# Patient Record
Sex: FEMALE | Race: WHITE | NOT HISPANIC OR LATINO | Employment: UNEMPLOYED | URBAN - METROPOLITAN AREA
[De-identification: names, ages, dates, MRNs, and addresses within clinical notes are randomized per-mention and may not be internally consistent; named-entity substitution may affect disease eponyms.]

---

## 2023-03-01 LAB
ALANINE AMINOTRANSFERASE (SGPT) (U/L) IN SER/PLAS: 12 U/L (ref 7–45)
ALBUMIN (G/DL) IN SER/PLAS: 4.1 G/DL (ref 3.4–5)
ALKALINE PHOSPHATASE (U/L) IN SER/PLAS: 115 U/L (ref 33–136)
ANION GAP IN SER/PLAS: 12 MMOL/L (ref 10–20)
ASPARTATE AMINOTRANSFERASE (SGOT) (U/L) IN SER/PLAS: 15 U/L (ref 9–39)
BILIRUBIN TOTAL (MG/DL) IN SER/PLAS: 0.7 MG/DL (ref 0–1.2)
CALCIUM (MG/DL) IN SER/PLAS: 9.6 MG/DL (ref 8.6–10.6)
CARBON DIOXIDE, TOTAL (MMOL/L) IN SER/PLAS: 29 MMOL/L (ref 21–32)
CHLORIDE (MMOL/L) IN SER/PLAS: 101 MMOL/L (ref 98–107)
CHOLESTEROL (MG/DL) IN SER/PLAS: 153 MG/DL (ref 0–199)
CHOLESTEROL IN HDL (MG/DL) IN SER/PLAS: 51.3 MG/DL
CHOLESTEROL/HDL RATIO: 3
CREATININE (MG/DL) IN SER/PLAS: 1.37 MG/DL (ref 0.5–1.05)
ERYTHROCYTE DISTRIBUTION WIDTH (RATIO) BY AUTOMATED COUNT: 11.9 % (ref 11.5–14.5)
ERYTHROCYTE MEAN CORPUSCULAR HEMOGLOBIN CONCENTRATION (G/DL) BY AUTOMATED: 33.1 G/DL (ref 32–36)
ERYTHROCYTE MEAN CORPUSCULAR VOLUME (FL) BY AUTOMATED COUNT: 95 FL (ref 80–100)
ERYTHROCYTES (10*6/UL) IN BLOOD BY AUTOMATED COUNT: 4 X10E12/L (ref 4–5.2)
GFR FEMALE: 40 ML/MIN/1.73M2
GLUCOSE (MG/DL) IN SER/PLAS: 107 MG/DL (ref 74–99)
HEMATOCRIT (%) IN BLOOD BY AUTOMATED COUNT: 38.1 % (ref 36–46)
HEMOGLOBIN (G/DL) IN BLOOD: 12.6 G/DL (ref 12–16)
LDL: 80 MG/DL (ref 0–99)
LEUKOCYTES (10*3/UL) IN BLOOD BY AUTOMATED COUNT: 6.8 X10E9/L (ref 4.4–11.3)
MAGNESIUM (MG/DL) IN SER/PLAS: 1.85 MG/DL (ref 1.6–2.4)
NRBC (PER 100 WBCS) BY AUTOMATED COUNT: 0 /100 WBC (ref 0–0)
PLATELETS (10*3/UL) IN BLOOD AUTOMATED COUNT: 276 X10E9/L (ref 150–450)
POTASSIUM (MMOL/L) IN SER/PLAS: 4.3 MMOL/L (ref 3.5–5.3)
PROTEIN TOTAL: 6.8 G/DL (ref 6.4–8.2)
SODIUM (MMOL/L) IN SER/PLAS: 138 MMOL/L (ref 136–145)
TRIGLYCERIDE (MG/DL) IN SER/PLAS: 109 MG/DL (ref 0–149)
UREA NITROGEN (MG/DL) IN SER/PLAS: 27 MG/DL (ref 6–23)
VLDL: 22 MG/DL (ref 0–40)

## 2024-02-06 ENCOUNTER — OFFICE VISIT (OUTPATIENT)
Dept: OPHTHALMOLOGY | Facility: CLINIC | Age: 79
End: 2024-02-06
Payer: MEDICARE

## 2024-02-06 DIAGNOSIS — H52.223 MYOPIA OF BOTH EYES WITH REGULAR ASTIGMATISM AND PRESBYOPIA: ICD-10-CM

## 2024-02-06 DIAGNOSIS — H52.4 MYOPIA OF BOTH EYES WITH REGULAR ASTIGMATISM AND PRESBYOPIA: ICD-10-CM

## 2024-02-06 DIAGNOSIS — H04.123 DRY EYE SYNDROME OF BOTH EYES: ICD-10-CM

## 2024-02-06 DIAGNOSIS — H02.889 MEIBOMIAN GLAND DYSFUNCTION: ICD-10-CM

## 2024-02-06 DIAGNOSIS — H25.013 CORTICAL AGE-RELATED CATARACT OF BOTH EYES: Primary | ICD-10-CM

## 2024-02-06 DIAGNOSIS — H52.13 MYOPIA OF BOTH EYES WITH REGULAR ASTIGMATISM AND PRESBYOPIA: ICD-10-CM

## 2024-02-06 PROCEDURE — 92015 DETERMINE REFRACTIVE STATE: CPT | Performed by: STUDENT IN AN ORGANIZED HEALTH CARE EDUCATION/TRAINING PROGRAM

## 2024-02-06 PROCEDURE — 92014 COMPRE OPH EXAM EST PT 1/>: CPT | Performed by: STUDENT IN AN ORGANIZED HEALTH CARE EDUCATION/TRAINING PROGRAM

## 2024-02-06 RX ORDER — HYDROGEN PEROXIDE 3 %
20 SOLUTION, NON-ORAL MISCELLANEOUS
COMMUNITY
Start: 2016-09-13

## 2024-02-06 RX ORDER — ACETAMINOPHEN 500 MG
TABLET ORAL
COMMUNITY
Start: 2019-06-18

## 2024-02-06 RX ORDER — HYDROCHLOROTHIAZIDE 25 MG/1
TABLET ORAL
COMMUNITY
Start: 2023-02-17

## 2024-02-06 ASSESSMENT — REFRACTION_WEARINGRX
OD_AXIS: 015
OS_ADD: +2.50
OS_AXIS: 165
OD_SPHERE: -2.50
OD_ADD: +2.50
OD_CYLINDER: +0.75
OS_CYLINDER: +1.25
OS_SPHERE: -2.25

## 2024-02-06 ASSESSMENT — CUP TO DISC RATIO
OS_RATIO: .15
OD_RATIO: .15

## 2024-02-06 ASSESSMENT — CONF VISUAL FIELD
OS_INFERIOR_TEMPORAL_RESTRICTION: 0
OD_SUPERIOR_NASAL_RESTRICTION: 0
OS_SUPERIOR_NASAL_RESTRICTION: 0
OS_INFERIOR_NASAL_RESTRICTION: 0
OD_INFERIOR_NASAL_RESTRICTION: 0
METHOD: COUNTING FINGERS
OD_INFERIOR_TEMPORAL_RESTRICTION: 0
OS_NORMAL: 1
OD_NORMAL: 1
OS_SUPERIOR_TEMPORAL_RESTRICTION: 0
OD_SUPERIOR_TEMPORAL_RESTRICTION: 0

## 2024-02-06 ASSESSMENT — ENCOUNTER SYMPTOMS
CONSTITUTIONAL NEGATIVE: 0
CARDIOVASCULAR NEGATIVE: 0
ALLERGIC/IMMUNOLOGIC NEGATIVE: 0
HEMATOLOGIC/LYMPHATIC NEGATIVE: 0
NEUROLOGICAL NEGATIVE: 0
PSYCHIATRIC NEGATIVE: 0
MUSCULOSKELETAL NEGATIVE: 0
ENDOCRINE NEGATIVE: 0
EYES NEGATIVE: 0
RESPIRATORY NEGATIVE: 0
GASTROINTESTINAL NEGATIVE: 0

## 2024-02-06 ASSESSMENT — VISUAL ACUITY
OS_CC: 20/30
METHOD: SNELLEN - LINEAR
OD_CC: 20/30
CORRECTION_TYPE: GLASSES

## 2024-02-06 ASSESSMENT — REFRACTION_MANIFEST
OD_SPHERE: -2.25
OS_SPHERE: -2.25
OS_CYLINDER: +1.25
OD_ADD: +2.50
OS_AXIS: 172
OD_AXIS: 012
OS_ADD: +2.50
OD_CYLINDER: +0.50

## 2024-02-06 ASSESSMENT — EXTERNAL EXAM - RIGHT EYE: OD_EXAM: NORMAL

## 2024-02-06 ASSESSMENT — SLIT LAMP EXAM - LIDS
COMMENTS: GOOD POSITION, MGD UL/LL C CAPPED GLANDS
COMMENTS: GOOD POSITION, MGD UL/LL C CAPPED GLANDS

## 2024-02-06 ASSESSMENT — TONOMETRY
IOP_METHOD: GOLDMANN APPLANATION
OS_IOP_MMHG: 12
OD_IOP_MMHG: 11

## 2024-02-06 ASSESSMENT — EXTERNAL EXAM - LEFT EYE: OS_EXAM: NORMAL

## 2024-02-06 NOTE — PROGRESS NOTES
Assessment/Plan   Diagnoses and all orders for this visit:  Cortical age-related cataract of both eyes  Myopia of both eyes with regular astigmatism and presbyopia  Dry eye syndrome of both eyes  Meibomian gland dysfunction  -New spec rx released today per patient request. Ocular health wnl for age OU. Monitor 1 year or sooner prn. Refraction billed today.  -Mild reduction in BCVA 20/25 OD+OS due to cataracts and mild ocular surface disease. Pt ed on findings. Monitor cataracts at this time. Advised using OTC AT's daily and adding warm compresses    RTC 1 year for annual with DFE and MRX

## 2024-02-15 ENCOUNTER — OFFICE VISIT (OUTPATIENT)
Dept: CARDIOLOGY | Facility: CLINIC | Age: 79
End: 2024-02-15
Payer: MEDICARE

## 2024-02-15 VITALS
DIASTOLIC BLOOD PRESSURE: 77 MMHG | BODY MASS INDEX: 26.54 KG/M2 | SYSTOLIC BLOOD PRESSURE: 143 MMHG | WEIGHT: 159.3 LBS | HEART RATE: 73 BPM | OXYGEN SATURATION: 98 % | RESPIRATION RATE: 16 BRPM | HEIGHT: 65 IN

## 2024-02-15 DIAGNOSIS — E78.5 HYPERLIPIDEMIA, UNSPECIFIED HYPERLIPIDEMIA TYPE: ICD-10-CM

## 2024-02-15 DIAGNOSIS — I10 HYPERTENSION, UNSPECIFIED TYPE: Primary | ICD-10-CM

## 2024-02-15 DIAGNOSIS — I25.10 CORONARY ARTERY DISEASE INVOLVING NATIVE CORONARY ARTERY OF NATIVE HEART WITHOUT ANGINA PECTORIS: ICD-10-CM

## 2024-02-15 PROCEDURE — 3078F DIAST BP <80 MM HG: CPT | Performed by: INTERNAL MEDICINE

## 2024-02-15 PROCEDURE — 1036F TOBACCO NON-USER: CPT | Performed by: INTERNAL MEDICINE

## 2024-02-15 PROCEDURE — 1159F MED LIST DOCD IN RCRD: CPT | Performed by: INTERNAL MEDICINE

## 2024-02-15 PROCEDURE — 99213 OFFICE O/P EST LOW 20 MIN: CPT | Performed by: INTERNAL MEDICINE

## 2024-02-15 PROCEDURE — 1160F RVW MEDS BY RX/DR IN RCRD: CPT | Performed by: INTERNAL MEDICINE

## 2024-02-15 PROCEDURE — 1126F AMNT PAIN NOTED NONE PRSNT: CPT | Performed by: INTERNAL MEDICINE

## 2024-02-15 PROCEDURE — 3077F SYST BP >= 140 MM HG: CPT | Performed by: INTERNAL MEDICINE

## 2024-02-15 RX ORDER — ASPIRIN 81 MG/1
1 TABLET ORAL DAILY
COMMUNITY
Start: 2020-08-14

## 2024-02-15 ASSESSMENT — ENCOUNTER SYMPTOMS
COUGH: 0
HEMOPTYSIS: 0
DIARRHEA: 0
HEMATURIA: 0
WHEEZING: 0
DYSURIA: 0
MEMORY LOSS: 0
CONSTIPATION: 0
VOMITING: 0
ABDOMINAL PAIN: 0
NAUSEA: 0
FEVER: 0
MYALGIAS: 0
ALTERED MENTAL STATUS: 0
FALLS: 0
HEADACHES: 0
DEPRESSION: 0
BLOATING: 0
CHILLS: 0

## 2024-02-15 ASSESSMENT — PATIENT HEALTH QUESTIONNAIRE - PHQ9
1. LITTLE INTEREST OR PLEASURE IN DOING THINGS: NOT AT ALL
2. FEELING DOWN, DEPRESSED OR HOPELESS: NOT AT ALL
SUM OF ALL RESPONSES TO PHQ9 QUESTIONS 1 AND 2: 0

## 2024-02-15 NOTE — PROGRESS NOTES
Chief complaint:  Follow-up (Annual FUV)     HPI  79 yo WF w/ h/o CAD, HTN, HPL, VV now here for cardiology f/u. No chest pain. No dyspnea. No palps. No LH/dizziness/syncope. No edema. No claudication. No cough. +occ snoring, sig less after sinus surgery. No apnea. +occ back pain.  Pre back/neck issues, she was exercising with  3-4d/wk with no symptoms. Now just some walking. She plans to get back with  soon since back/neck better.  BP at home: ~120/80  ECG 7/18: SR (64)  ECG 6/19: SR (76)  MARIO 1/14: 2mm ST dep, EF 70% -> 80%, SVT x 40s  Cath 1/14: LM nl, LAD 60%, LCx nl, RCA nl, LVEDP 10  LE US 8/18: no DVT  RLE US 6/20: no DVT     Review of Systems   Constitutional: Negative for chills, fever and malaise/fatigue.   HENT:  Negative for hearing loss.    Eyes:  Negative for visual disturbance.   Respiratory:  Negative for cough, hemoptysis and wheezing.    Skin:  Negative for rash.   Musculoskeletal:  Negative for falls and myalgias.   Gastrointestinal:  Negative for bloating, abdominal pain, constipation, diarrhea, dysphagia, nausea and vomiting.   Genitourinary:  Negative for dysuria and hematuria.   Neurological:  Negative for headaches.   Psychiatric/Behavioral:  Negative for altered mental status, depression and memory loss.       Social History     Tobacco Use    Smoking status: Never    Smokeless tobacco: Never   Substance Use Topics    Alcohol use: Not Currently      No pertinent FHx    Allergies   Allergen Reactions    Quinalan Anaphylaxis    Bee Pollen Unknown    House Dust Unknown      Current Outpatient Medications   Medication Instructions    aspirin 81 mg EC tablet 1 tablet, oral, Daily    atorvastatin (LIPITOR) 40 mg, oral, Daily    carvedilol (COREG) 25 mg, oral, 2 times daily with meals    cholecalciferol (Vitamin D-3) 5,000 Units tablet oral    esomeprazole (NEXIUM) 20 mg, oral, Daily RT    hydroCHLOROthiazide (HYDRODiuril) 25 mg tablet     losartan (COZAAR) 100 mg, oral, Daily       Vitals:    02/15/24 1013   BP: 143/77   Pulse: 73   Resp: 16   SpO2: 98%      Physical Exam  Constitutional:       Appearance: Normal appearance.   HENT:      Head: Normocephalic and atraumatic.      Nose: Nose normal.   Neck:      Vascular: No carotid bruit.   Cardiovascular:      Rate and Rhythm: Normal rate and regular rhythm.      Heart sounds: No murmur heard.  Pulmonary:      Effort: Pulmonary effort is normal.      Breath sounds: Normal breath sounds.   Abdominal:      Palpations: Abdomen is soft.      Tenderness: There is no abdominal tenderness.   Musculoskeletal:      Right lower leg: No edema.      Left lower leg: No edema.   Skin:     General: Skin is warm and dry.   Neurological:      General: No focal deficit present.      Mental Status: She is alert.   Psychiatric:         Mood and Affect: Mood normal.         Judgment: Judgment normal.        Results/Data  2/23 Cr 1.37, K 4.3, Mg 1.85, LFT nl, LDL 80, HDL 51, , Chol 153, HGB 12.6,   6/21 LDL 59, HDL 57, , Chol 154  5/20 Cr 0.85, K 4.2, HGB 13,   5/18 Cr 0.87, K 3.8, HGB 13.9, , TSH 1.97, TPN neg  1/18 Cr 0.77, K 4.6, LFT nl, LDL 72, HDL 63, TG 97, Chol 155     Assessment/Plan   79 yo WF w/ h/o CAD, HTN, HPL, VV. Doing fair. No cardiac symptoms. BP slightly high today (better than prior clinic appts) but good at home (c/w [facility]).  -continue ASA 81 qd  -continue Carvedilol 25 bid  -continue Losartan 100 qd   -continue HCTZ 25 qd (can later combine with Losartan); she had edema on Amlodipine -> recheck Cr now  -continue Atorva 40 qhs -> goal LDL <70  -consider sleep study, [place] if any witnessed apnea (and/or BP cannot be controlled)  -f/u 1 year (earlier if needed)     Fabio Gregg MD

## 2024-02-19 ENCOUNTER — TELEPHONE (OUTPATIENT)
Dept: CARDIOLOGY | Facility: CLINIC | Age: 79
End: 2024-02-19
Payer: MEDICARE

## 2024-02-19 RX ORDER — HYDRALAZINE HYDROCHLORIDE 10 MG/1
10 TABLET, FILM COATED ORAL 2 TIMES DAILY
COMMUNITY
End: 2024-04-11 | Stop reason: SDUPTHER

## 2024-02-22 ENCOUNTER — LAB (OUTPATIENT)
Dept: LAB | Facility: LAB | Age: 79
End: 2024-02-22
Payer: MEDICARE

## 2024-02-22 DIAGNOSIS — I25.10 CORONARY ARTERY DISEASE INVOLVING NATIVE CORONARY ARTERY OF NATIVE HEART WITHOUT ANGINA PECTORIS: ICD-10-CM

## 2024-02-22 LAB
ALBUMIN SERPL BCP-MCNC: 3.8 G/DL (ref 3.4–5)
ALP SERPL-CCNC: 97 U/L (ref 33–136)
ALT SERPL W P-5'-P-CCNC: 11 U/L (ref 7–45)
ANION GAP SERPL CALC-SCNC: 11 MMOL/L (ref 10–20)
AST SERPL W P-5'-P-CCNC: 12 U/L (ref 9–39)
BILIRUB SERPL-MCNC: 0.6 MG/DL (ref 0–1.2)
BUN SERPL-MCNC: 16 MG/DL (ref 6–23)
CALCIUM SERPL-MCNC: 9.4 MG/DL (ref 8.6–10.6)
CHLORIDE SERPL-SCNC: 107 MMOL/L (ref 98–107)
CHOLEST SERPL-MCNC: 148 MG/DL (ref 0–199)
CHOLESTEROL/HDL RATIO: 2.8
CO2 SERPL-SCNC: 25 MMOL/L (ref 21–32)
CREAT SERPL-MCNC: 1.23 MG/DL (ref 0.5–1.05)
EGFRCR SERPLBLD CKD-EPI 2021: 46 ML/MIN/1.73M*2
ERYTHROCYTE [DISTWIDTH] IN BLOOD BY AUTOMATED COUNT: 13.2 % (ref 11.5–14.5)
GLUCOSE SERPL-MCNC: 95 MG/DL (ref 74–99)
HCT VFR BLD AUTO: 34.7 % (ref 36–46)
HDLC SERPL-MCNC: 53.5 MG/DL
HGB BLD-MCNC: 11.4 G/DL (ref 12–16)
LDLC SERPL CALC-MCNC: 68 MG/DL
MCH RBC QN AUTO: 30.6 PG (ref 26–34)
MCHC RBC AUTO-ENTMCNC: 32.9 G/DL (ref 32–36)
MCV RBC AUTO: 93 FL (ref 80–100)
NON HDL CHOLESTEROL: 95 MG/DL (ref 0–149)
NRBC BLD-RTO: 0 /100 WBCS (ref 0–0)
PLATELET # BLD AUTO: 230 X10*3/UL (ref 150–450)
POTASSIUM SERPL-SCNC: 4.2 MMOL/L (ref 3.5–5.3)
PROT SERPL-MCNC: 6.6 G/DL (ref 6.4–8.2)
RBC # BLD AUTO: 3.73 X10*6/UL (ref 4–5.2)
SODIUM SERPL-SCNC: 139 MMOL/L (ref 136–145)
TRIGL SERPL-MCNC: 133 MG/DL (ref 0–149)
VLDL: 27 MG/DL (ref 0–40)
WBC # BLD AUTO: 5.4 X10*3/UL (ref 4.4–11.3)

## 2024-02-22 PROCEDURE — 80061 LIPID PANEL: CPT

## 2024-02-22 PROCEDURE — 36415 COLL VENOUS BLD VENIPUNCTURE: CPT

## 2024-02-22 PROCEDURE — 85027 COMPLETE CBC AUTOMATED: CPT

## 2024-02-22 PROCEDURE — 80053 COMPREHEN METABOLIC PANEL: CPT

## 2024-04-11 DIAGNOSIS — I10 HYPERTENSION, UNSPECIFIED TYPE: Primary | ICD-10-CM

## 2024-04-11 RX ORDER — HYDRALAZINE HYDROCHLORIDE 10 MG/1
10 TABLET, FILM COATED ORAL 2 TIMES DAILY
Qty: 180 TABLET | Refills: 3 | Status: SHIPPED | OUTPATIENT
Start: 2024-04-11

## 2025-02-11 ENCOUNTER — APPOINTMENT (OUTPATIENT)
Dept: OPHTHALMOLOGY | Facility: CLINIC | Age: 80
End: 2025-02-11
Payer: MEDICARE

## 2025-02-11 DIAGNOSIS — H25.013 CORTICAL AGE-RELATED CATARACT OF BOTH EYES: Primary | ICD-10-CM

## 2025-02-11 DIAGNOSIS — H52.223 MYOPIA OF BOTH EYES WITH REGULAR ASTIGMATISM AND PRESBYOPIA: ICD-10-CM

## 2025-02-11 DIAGNOSIS — D31.32 CHOROIDAL NEVUS OF LEFT EYE: ICD-10-CM

## 2025-02-11 DIAGNOSIS — H04.123 DRY EYE SYNDROME OF BOTH EYES: ICD-10-CM

## 2025-02-11 DIAGNOSIS — H52.13 MYOPIA OF BOTH EYES WITH REGULAR ASTIGMATISM AND PRESBYOPIA: ICD-10-CM

## 2025-02-11 DIAGNOSIS — H52.4 MYOPIA OF BOTH EYES WITH REGULAR ASTIGMATISM AND PRESBYOPIA: ICD-10-CM

## 2025-02-11 DIAGNOSIS — Z83.518 FAMILY HISTORY OF MACULAR DEGENERATION: ICD-10-CM

## 2025-02-11 PROCEDURE — 92014 COMPRE OPH EXAM EST PT 1/>: CPT | Performed by: STUDENT IN AN ORGANIZED HEALTH CARE EDUCATION/TRAINING PROGRAM

## 2025-02-11 PROCEDURE — 92015 DETERMINE REFRACTIVE STATE: CPT | Performed by: STUDENT IN AN ORGANIZED HEALTH CARE EDUCATION/TRAINING PROGRAM

## 2025-02-11 ASSESSMENT — ENCOUNTER SYMPTOMS
ALLERGIC/IMMUNOLOGIC NEGATIVE: 0
GASTROINTESTINAL NEGATIVE: 0
CONSTITUTIONAL NEGATIVE: 0
PSYCHIATRIC NEGATIVE: 0
RESPIRATORY NEGATIVE: 0
HEMATOLOGIC/LYMPHATIC NEGATIVE: 0
MUSCULOSKELETAL NEGATIVE: 0
ENDOCRINE NEGATIVE: 0
NEUROLOGICAL NEGATIVE: 0
EYES NEGATIVE: 0
CARDIOVASCULAR NEGATIVE: 0

## 2025-02-11 ASSESSMENT — CONF VISUAL FIELD
OS_SUPERIOR_NASAL_RESTRICTION: 0
OS_INFERIOR_NASAL_RESTRICTION: 0
OD_SUPERIOR_TEMPORAL_RESTRICTION: 0
METHOD: COUNTING FINGERS
OD_INFERIOR_TEMPORAL_RESTRICTION: 0
OD_SUPERIOR_NASAL_RESTRICTION: 0
OD_INFERIOR_NASAL_RESTRICTION: 0
OS_SUPERIOR_TEMPORAL_RESTRICTION: 0
OD_NORMAL: 1
OS_INFERIOR_TEMPORAL_RESTRICTION: 0
OS_NORMAL: 1

## 2025-02-11 ASSESSMENT — VISUAL ACUITY
OD_CC: 20/20-1
METHOD: SNELLEN - LINEAR
CORRECTION_TYPE: GLASSES
OS_CC: 20/20

## 2025-02-11 ASSESSMENT — TONOMETRY
OD_IOP_MMHG: 12
OS_IOP_MMHG: 11
IOP_METHOD: GOLDMANN APPLANATION

## 2025-02-11 ASSESSMENT — CUP TO DISC RATIO
OS_RATIO: .15
OD_RATIO: .15

## 2025-02-11 ASSESSMENT — REFRACTION_MANIFEST
OS_AXIS: 172
OS_ADD: +2.50
OD_ADD: +2.50
OD_AXIS: 012
OS_CYLINDER: +1.25
OS_SPHERE: -2.50
OD_CYLINDER: +0.50
OD_SPHERE: -2.25

## 2025-02-11 ASSESSMENT — REFRACTION_WEARINGRX
OD_SPHERE: -2.25
OS_CYLINDER: +1.25
OS_SPHERE: -2.25
OD_AXIS: 012
OS_AXIS: 172
OD_CYLINDER: +0.50
OD_ADD: +2.50
OS_ADD: +2.50

## 2025-02-11 ASSESSMENT — EXTERNAL EXAM - RIGHT EYE: OD_EXAM: NORMAL

## 2025-02-11 ASSESSMENT — EXTERNAL EXAM - LEFT EYE: OS_EXAM: NORMAL

## 2025-02-11 NOTE — PROGRESS NOTES
Assessment/Plan   Diagnoses and all orders for this visit:  Cortical age-related cataract of both eyes  Myopia of both eyes with regular astigmatism and presbyopia  Dry eye syndrome of both eyes  Meibomian gland dysfunction  -New spec rx released today per patient request. Ocular health wnl for age OU. Monitor 1 year or sooner prn. Refraction billed today.  -Mild reduction in BCVA ~20/25 OD+OS due to cataracts and mild ocular surface disease. Pt ed on findings. Monitor cataracts at this time. Advised using OTC AT's daily and adding warm compresses.  Family History of macular degeneration-mother  -mild mottling on exam but otherwise within normal limits  -patient reports healthy diet  -Discussed imporantance of sun protection outdoors, not smoking, healthy diet and exercise.   -obtain MAC OCT at next exam to monitor  Choroidal nevus left eye  -benign features-monitor-low risk    RTC 1 year for annual with DFE, MRX, OCT MAC

## 2025-02-27 ENCOUNTER — OFFICE VISIT (OUTPATIENT)
Dept: CARDIOLOGY | Facility: CLINIC | Age: 80
End: 2025-02-27
Payer: MEDICARE

## 2025-02-27 VITALS
WEIGHT: 154 LBS | HEIGHT: 66 IN | OXYGEN SATURATION: 95 % | SYSTOLIC BLOOD PRESSURE: 122 MMHG | HEART RATE: 78 BPM | BODY MASS INDEX: 24.75 KG/M2 | DIASTOLIC BLOOD PRESSURE: 74 MMHG

## 2025-02-27 DIAGNOSIS — E78.5 HYPERLIPIDEMIA, UNSPECIFIED HYPERLIPIDEMIA TYPE: ICD-10-CM

## 2025-02-27 DIAGNOSIS — I25.10 CORONARY ARTERY DISEASE INVOLVING NATIVE CORONARY ARTERY OF NATIVE HEART WITHOUT ANGINA PECTORIS: ICD-10-CM

## 2025-02-27 DIAGNOSIS — I10 HYPERTENSION, UNSPECIFIED TYPE: Primary | ICD-10-CM

## 2025-02-27 LAB
ATRIAL RATE: 67 BPM
P AXIS: 69 DEGREES
P OFFSET: 196 MS
P ONSET: 135 MS
PR INTERVAL: 176 MS
Q ONSET: 223 MS
QRS COUNT: 11 BEATS
QRS DURATION: 88 MS
QT INTERVAL: 406 MS
QTC CALCULATION(BAZETT): 429 MS
QTC FREDERICIA: 421 MS
R AXIS: 50 DEGREES
T AXIS: 84 DEGREES
T OFFSET: 426 MS
VENTRICULAR RATE: 67 BPM

## 2025-02-27 PROCEDURE — 1036F TOBACCO NON-USER: CPT | Performed by: INTERNAL MEDICINE

## 2025-02-27 PROCEDURE — 93005 ELECTROCARDIOGRAM TRACING: CPT | Performed by: INTERNAL MEDICINE

## 2025-02-27 PROCEDURE — 99214 OFFICE O/P EST MOD 30 MIN: CPT | Performed by: INTERNAL MEDICINE

## 2025-02-27 PROCEDURE — 93010 ELECTROCARDIOGRAM REPORT: CPT | Performed by: INTERNAL MEDICINE

## 2025-02-27 PROCEDURE — 3074F SYST BP LT 130 MM HG: CPT | Performed by: INTERNAL MEDICINE

## 2025-02-27 PROCEDURE — 1159F MED LIST DOCD IN RCRD: CPT | Performed by: INTERNAL MEDICINE

## 2025-02-27 PROCEDURE — 3078F DIAST BP <80 MM HG: CPT | Performed by: INTERNAL MEDICINE

## 2025-02-27 PROCEDURE — 99214 OFFICE O/P EST MOD 30 MIN: CPT | Mod: 25 | Performed by: INTERNAL MEDICINE

## 2025-02-27 ASSESSMENT — PATIENT HEALTH QUESTIONNAIRE - PHQ9
1. LITTLE INTEREST OR PLEASURE IN DOING THINGS: NOT AT ALL
SUM OF ALL RESPONSES TO PHQ9 QUESTIONS 1 AND 2: 0
2. FEELING DOWN, DEPRESSED OR HOPELESS: NOT AT ALL

## 2025-02-27 ASSESSMENT — ENCOUNTER SYMPTOMS
DEPRESSION: 0
HEADACHES: 0
HEMATURIA: 0
MYALGIAS: 0
ALTERED MENTAL STATUS: 0
DIARRHEA: 0
ABDOMINAL PAIN: 0
DYSURIA: 0
CHILLS: 0
COUGH: 0
CONSTIPATION: 0
HEMOPTYSIS: 0
MEMORY LOSS: 0
FALLS: 0
BLOATING: 0
NAUSEA: 0
WHEEZING: 0
FEVER: 0
VOMITING: 0

## 2025-02-27 NOTE — PROGRESS NOTES
Chief Complaint   Patient presents with    Coronary Artery Disease    Hypertension       HPI  80 yo WF w/ h/o CAD, HTN, HPL, VV now here for cardiology f/u. No chest pain. No dyspnea. No palps. +occ LH when BP low. No syncope. No edema. No claudication. No cough. +occ snoring, sig less after sinus surgery. No apnea. +occ back pain. Activity limited by back/neck pain.  BP at home: ~110/70 (occ  with LH)  ECG 7/18: SR (64)  ECG 6/19: SR (76)  ECG 2/25: SR (67), normal  MARIO 1/14: 2mm ST dep, EF 70% -> 80%, SVT x 40s  Cath 1/14: LM nl, LAD 60%, LCx nl, RCA nl, LVEDP 10  LE US 8/18: no DVT  RLE US 6/20: no DVT     Review of Systems   Constitutional: Negative for chills, fever and malaise/fatigue.   HENT:  Negative for hearing loss.    Eyes:  Negative for visual disturbance.   Respiratory:  Negative for cough, hemoptysis and wheezing.    Skin:  Negative for rash.   Musculoskeletal:  Negative for falls and myalgias.   Gastrointestinal:  Negative for bloating, abdominal pain, constipation, diarrhea, dysphagia, nausea and vomiting.   Genitourinary:  Negative for dysuria and hematuria.   Neurological:  Negative for headaches.   Psychiatric/Behavioral:  Negative for altered mental status, depression and memory loss.       Social History     Tobacco Use    Smoking status: Never    Smokeless tobacco: Never   Substance Use Topics    Alcohol use: Not Currently      No pertinent FHx    Allergies   Allergen Reactions    Quinalan Anaphylaxis    Bee Pollen Unknown    House Dust Unknown      Current Outpatient Medications   Medication Instructions    aspirin 81 mg EC tablet 1 tablet, Daily    atorvastatin (LIPITOR) 40 mg, oral, Daily    carvedilol (COREG) 25 mg, oral, 2 times daily (morning and late afternoon)    esomeprazole (NEXIUM) 20 mg, Daily RT    hydrALAZINE (APRESOLINE) 10 mg, oral, 2 times daily    losartan (COZAAR) 100 mg, oral, Daily      Vitals:    02/27/25 1101   BP: 122/74   Pulse: 78   SpO2: 95%      Physical  Exam  Constitutional:       Appearance: Normal appearance.   HENT:      Head: Normocephalic and atraumatic.      Nose: Nose normal.   Neck:      Vascular: No carotid bruit.   Cardiovascular:      Rate and Rhythm: Normal rate and regular rhythm.      Heart sounds: No murmur heard.  Pulmonary:      Effort: Pulmonary effort is normal.      Breath sounds: Normal breath sounds.   Abdominal:      Palpations: Abdomen is soft.      Tenderness: There is no abdominal tenderness.   Musculoskeletal:      Right lower leg: No edema.      Left lower leg: No edema.   Skin:     General: Skin is warm and dry.   Neurological:      General: No focal deficit present.      Mental Status: She is alert.   Psychiatric:         Mood and Affect: Mood normal.         Judgment: Judgment normal.        Results/Data  2/24 Cr 1.23, K 4.2, LFT nl, LDL 68, HDL 53, , Chol 148, HGB 11.4,  (HCTZ 25)  2/23 Cr 1.37, K 4.3, Mg 1.85, LFT nl, LDL 80, HDL 51, , Chol 153, HGB 12.6,   6/21 LDL 59, HDL 57, , Chol 154  5/20 Cr 0.85, K 4.2, HGB 13,   5/18 Cr 0.87, K 3.8, HGB 13.9, , TSH 1.97, TPN neg  1/18 Cr 0.77, K 4.6, LFT nl, LDL 72, HDL 63, TG 97, Chol 155     Assessment/Plan   78 yo WF w/ h/o CAD, HTN, HPL, VV. Doing good. No cardiac symptoms. Occ LH when BP low. Since BP good including at home, try stopping Hydralazine. If BP needs, can resume or try another agent (ie Amlodipine 2.5). Check labs today (off HCTZ).  -continue ASA 81 qd  -continue Carvedilol 25 bid  -continue Losartan 100 qd   -try stopping Hydralazine 10 bid  -continue Atorva 40 qhs -> goal LDL <70  -consider sleep study, [place] if any witnessed apnea (and/or BP cannot be controlled)  -f/u 1 year (earlier if needed)     Fabio Gregg MD

## 2025-02-28 DIAGNOSIS — N18.9 CHRONIC KIDNEY DISEASE, UNSPECIFIED CKD STAGE: Primary | ICD-10-CM

## 2025-02-28 LAB
ALBUMIN SERPL-MCNC: 4.1 G/DL (ref 3.6–5.1)
ALP SERPL-CCNC: 93 U/L (ref 37–153)
ALT SERPL-CCNC: 9 U/L (ref 6–29)
ANION GAP SERPL CALCULATED.4IONS-SCNC: 9 MMOL/L (CALC) (ref 7–17)
AST SERPL-CCNC: 13 U/L (ref 10–35)
BILIRUB SERPL-MCNC: 0.6 MG/DL (ref 0.2–1.2)
BUN SERPL-MCNC: 24 MG/DL (ref 7–25)
CALCIUM SERPL-MCNC: 9.4 MG/DL (ref 8.6–10.4)
CHLORIDE SERPL-SCNC: 110 MMOL/L (ref 98–110)
CHOLEST SERPL-MCNC: 149 MG/DL
CHOLEST/HDLC SERPL: 3.4 (CALC)
CO2 SERPL-SCNC: 23 MMOL/L (ref 20–32)
CREAT SERPL-MCNC: 1.88 MG/DL (ref 0.6–1)
EGFRCR SERPLBLD CKD-EPI 2021: 27 ML/MIN/1.73M2
ERYTHROCYTE [DISTWIDTH] IN BLOOD BY AUTOMATED COUNT: 12.2 % (ref 11–15)
GLUCOSE SERPL-MCNC: 111 MG/DL (ref 65–99)
HCT VFR BLD AUTO: 35.3 % (ref 35–45)
HDLC SERPL-MCNC: 44 MG/DL
HGB BLD-MCNC: 11.6 G/DL (ref 11.7–15.5)
LDLC SERPL CALC-MCNC: 82 MG/DL (CALC)
MCH RBC QN AUTO: 31.3 PG (ref 27–33)
MCHC RBC AUTO-ENTMCNC: 32.9 G/DL (ref 32–36)
MCV RBC AUTO: 95.1 FL (ref 80–100)
NONHDLC SERPL-MCNC: 105 MG/DL (CALC)
PLATELET # BLD AUTO: 236 THOUSAND/UL (ref 140–400)
PMV BLD REES-ECKER: 10.2 FL (ref 7.5–12.5)
POTASSIUM SERPL-SCNC: 4.2 MMOL/L (ref 3.5–5.3)
PROT SERPL-MCNC: 7 G/DL (ref 6.1–8.1)
RBC # BLD AUTO: 3.71 MILLION/UL (ref 3.8–5.1)
SODIUM SERPL-SCNC: 142 MMOL/L (ref 135–146)
TRIGL SERPL-MCNC: 125 MG/DL
WBC # BLD AUTO: 4.7 THOUSAND/UL (ref 3.8–10.8)

## 2025-03-04 DIAGNOSIS — I10 HYPERTENSION, UNSPECIFIED TYPE: Primary | ICD-10-CM

## 2025-03-11 LAB
ALBUMIN SERPL-MCNC: 3.9 G/DL (ref 3.6–5.1)
BUN SERPL-MCNC: 16 MG/DL (ref 7–25)
BUN/CREAT SERPL: 15 (CALC) (ref 6–22)
CALCIUM SERPL-MCNC: 9.3 MG/DL (ref 8.6–10.4)
CHLORIDE SERPL-SCNC: 105 MMOL/L (ref 98–110)
CO2 SERPL-SCNC: 23 MMOL/L (ref 20–32)
CREAT SERPL-MCNC: 1.09 MG/DL (ref 0.6–1)
EGFRCR SERPLBLD CKD-EPI 2021: 52 ML/MIN/1.73M2
GLUCOSE SERPL-MCNC: 106 MG/DL (ref 65–99)
PHOSPHATE SERPL-MCNC: 3.7 MG/DL (ref 2.1–4.3)
POTASSIUM SERPL-SCNC: 4.1 MMOL/L (ref 3.5–5.3)
SODIUM SERPL-SCNC: 139 MMOL/L (ref 135–146)

## 2025-06-26 DIAGNOSIS — I10 HYPERTENSION, UNSPECIFIED TYPE: ICD-10-CM

## 2025-06-27 RX ORDER — CARVEDILOL 25 MG/1
25 TABLET ORAL
Qty: 180 TABLET | Refills: 2 | Status: SHIPPED | OUTPATIENT
Start: 2025-06-27 | End: 2026-03-24

## 2026-02-17 ENCOUNTER — APPOINTMENT (OUTPATIENT)
Dept: OPHTHALMOLOGY | Facility: CLINIC | Age: 81
End: 2026-02-17
Payer: MEDICARE